# Patient Record
Sex: FEMALE | Race: WHITE | Employment: OTHER | URBAN - METROPOLITAN AREA
[De-identification: names, ages, dates, MRNs, and addresses within clinical notes are randomized per-mention and may not be internally consistent; named-entity substitution may affect disease eponyms.]

---

## 2022-03-17 ENCOUNTER — APPOINTMENT (OUTPATIENT)
Dept: CT IMAGING | Age: 82
End: 2022-03-17
Payer: COMMERCIAL

## 2022-03-17 ENCOUNTER — APPOINTMENT (OUTPATIENT)
Dept: GENERAL RADIOLOGY | Age: 82
End: 2022-03-17
Payer: COMMERCIAL

## 2022-03-17 ENCOUNTER — HOSPITAL ENCOUNTER (EMERGENCY)
Age: 82
Discharge: HOME | End: 2022-03-17
Attending: STUDENT IN AN ORGANIZED HEALTH CARE EDUCATION/TRAINING PROGRAM
Payer: COMMERCIAL

## 2022-03-17 VITALS
HEART RATE: 66 BPM | RESPIRATION RATE: 18 BRPM | TEMPERATURE: 98.2 F | DIASTOLIC BLOOD PRESSURE: 114 MMHG | SYSTOLIC BLOOD PRESSURE: 145 MMHG | OXYGEN SATURATION: 96 %

## 2022-03-17 DIAGNOSIS — S39.012A STRAIN OF LUMBAR REGION, INITIAL ENCOUNTER: Primary | ICD-10-CM

## 2022-03-17 DIAGNOSIS — R93.7: ICD-10-CM
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
## 2022-03-17 LAB
ABSOLUTE EOS #: 0.06 K/UL (ref 0–0.44)
ABSOLUTE IMMATURE GRANULOCYTE: 0.04 K/UL (ref 0–0.3)
ABSOLUTE LYMPH #: 2.09 K/UL (ref 1.1–3.7)
ABSOLUTE MONO #: 0.67 K/UL (ref 0.1–1.2)
ANION GAP SERPL CALCULATED.3IONS-SCNC: 11 MMOL/L (ref 9–17)
BASOPHILS # BLD: 1 % (ref 0–2)
BASOPHILS ABSOLUTE: 0.08 K/UL (ref 0–0.2)
BILIRUBIN URINE: NEGATIVE
BUN BLDV-MCNC: 11 MG/DL (ref 8–23)
BUN/CREAT BLD: 18 (ref 9–20)
CALCIUM SERPL-MCNC: 9.6 MG/DL (ref 8.6–10.4)
CHLORIDE BLD-SCNC: 101 MMOL/L (ref 98–107)
CO2: 21 MMOL/L (ref 20–31)
COLOR: YELLOW
COMMENT UA: NORMAL
CREAT SERPL-MCNC: 0.61 MG/DL (ref 0.5–0.9)
EOSINOPHILS RELATIVE PERCENT: 1 % (ref 1–4)
GFR AFRICAN AMERICAN: >60 ML/MIN
GFR NON-AFRICAN AMERICAN: >60 ML/MIN
GFR SERPL CREATININE-BSD FRML MDRD: ABNORMAL ML/MIN/{1.73_M2}
GLUCOSE BLD-MCNC: 94 MG/DL (ref 70–99)
GLUCOSE URINE: NEGATIVE
HCT VFR BLD CALC: 41.4 % (ref 36.3–47.1)
HEMOGLOBIN: 13.8 G/DL (ref 11.9–15.1)
IMMATURE GRANULOCYTES: 0 %
KETONES, URINE: NEGATIVE
LEUKOCYTE ESTERASE, URINE: NEGATIVE
LYMPHOCYTES # BLD: 18 % (ref 24–43)
MCH RBC QN AUTO: 30.7 PG (ref 25.2–33.5)
MCHC RBC AUTO-ENTMCNC: 33.3 G/DL (ref 28.4–34.8)
MCV RBC AUTO: 92.2 FL (ref 82.6–102.9)
MONOCYTES # BLD: 6 % (ref 3–12)
MYOGLOBIN: 29 NG/ML (ref 25–58)
NITRITE, URINE: NEGATIVE
NRBC AUTOMATED: 0 PER 100 WBC
PDW BLD-RTO: 12.4 % (ref 11.8–14.4)
PH UA: 6 (ref 5–8)
PLATELET # BLD: 264 K/UL (ref 138–453)
PMV BLD AUTO: 10.4 FL (ref 8.1–13.5)
POTASSIUM SERPL-SCNC: 3.7 MMOL/L (ref 3.7–5.3)
PRO-BNP: 291 PG/ML
PROTEIN UA: NEGATIVE
RBC # BLD: 4.49 M/UL (ref 3.95–5.11)
SEG NEUTROPHILS: 74 % (ref 36–65)
SEGMENTED NEUTROPHILS ABSOLUTE COUNT: 8.68 K/UL (ref 1.5–8.1)
SODIUM BLD-SCNC: 133 MMOL/L (ref 135–144)
SPECIFIC GRAVITY UA: 1.02 (ref 1–1.03)
TROPONIN, HIGH SENSITIVITY: 9 NG/L (ref 0–14)
TURBIDITY: CLEAR
URINE HGB: NEGATIVE
UROBILINOGEN, URINE: NORMAL
WBC # BLD: 11.6 K/UL (ref 3.5–11.3)

## 2022-03-17 PROCEDURE — 6360000002 HC RX W HCPCS: Performed by: STUDENT IN AN ORGANIZED HEALTH CARE EDUCATION/TRAINING PROGRAM

## 2022-03-17 PROCEDURE — 93005 ELECTROCARDIOGRAM TRACING: CPT | Performed by: NURSE PRACTITIONER

## 2022-03-17 PROCEDURE — 72131 CT LUMBAR SPINE W/O DYE: CPT

## 2022-03-17 PROCEDURE — 81003 URINALYSIS AUTO W/O SCOPE: CPT

## 2022-03-17 PROCEDURE — 71045 X-RAY EXAM CHEST 1 VIEW: CPT

## 2022-03-17 PROCEDURE — 80048 BASIC METABOLIC PNL TOTAL CA: CPT

## 2022-03-17 PROCEDURE — 72192 CT PELVIS W/O DYE: CPT

## 2022-03-17 PROCEDURE — 87086 URINE CULTURE/COLONY COUNT: CPT

## 2022-03-17 PROCEDURE — 72125 CT NECK SPINE W/O DYE: CPT

## 2022-03-17 PROCEDURE — 6370000000 HC RX 637 (ALT 250 FOR IP): Performed by: NURSE PRACTITIONER

## 2022-03-17 PROCEDURE — 70450 CT HEAD/BRAIN W/O DYE: CPT

## 2022-03-17 PROCEDURE — 96372 THER/PROPH/DIAG INJ SC/IM: CPT

## 2022-03-17 PROCEDURE — 6360000002 HC RX W HCPCS: Performed by: NURSE PRACTITIONER

## 2022-03-17 PROCEDURE — 99283 EMERGENCY DEPT VISIT LOW MDM: CPT

## 2022-03-17 PROCEDURE — 96374 THER/PROPH/DIAG INJ IV PUSH: CPT

## 2022-03-17 PROCEDURE — 84484 ASSAY OF TROPONIN QUANT: CPT

## 2022-03-17 PROCEDURE — 83874 ASSAY OF MYOGLOBIN: CPT

## 2022-03-17 PROCEDURE — 85025 COMPLETE CBC W/AUTO DIFF WBC: CPT

## 2022-03-17 PROCEDURE — 83880 ASSAY OF NATRIURETIC PEPTIDE: CPT

## 2022-03-17 RX ORDER — FENTANYL CITRATE 50 UG/ML
25 INJECTION, SOLUTION INTRAMUSCULAR; INTRAVENOUS ONCE
Status: COMPLETED | OUTPATIENT
Start: 2022-03-17 | End: 2022-03-17

## 2022-03-17 RX ORDER — LIDOCAINE 4 G/G
1 PATCH TOPICAL ONCE
Status: DISCONTINUED | OUTPATIENT
Start: 2022-03-17 | End: 2022-03-18 | Stop reason: HOSPADM

## 2022-03-17 RX ORDER — FENTANYL CITRATE 50 UG/ML
25 INJECTION, SOLUTION INTRAMUSCULAR; INTRAVENOUS ONCE
Status: DISCONTINUED | OUTPATIENT
Start: 2022-03-17 | End: 2022-03-17

## 2022-03-17 RX ORDER — LIDOCAINE 50 MG/G
1 PATCH TOPICAL DAILY
Qty: 5 PATCH | Refills: 0 | Status: SHIPPED | OUTPATIENT
Start: 2022-03-17 | End: 2022-03-22

## 2022-03-17 RX ADMIN — FENTANYL CITRATE 25 MCG: 50 INJECTION, SOLUTION INTRAMUSCULAR; INTRAVENOUS at 20:37

## 2022-03-17 RX ADMIN — FENTANYL CITRATE 25 MCG: 50 INJECTION, SOLUTION INTRAMUSCULAR; INTRAVENOUS at 21:13

## 2022-03-17 ASSESSMENT — ENCOUNTER SYMPTOMS
VOMITING: 0
NAUSEA: 0
COUGH: 0
ABDOMINAL PAIN: 0
COLOR CHANGE: 0
SHORTNESS OF BREATH: 0
BACK PAIN: 1

## 2022-03-17 ASSESSMENT — PAIN SCALES - GENERAL
PAINLEVEL_OUTOF10: 9
PAINLEVEL_OUTOF10: 9

## 2022-03-17 ASSESSMENT — VISUAL ACUITY: OU: 1

## 2022-03-17 NOTE — ED PROVIDER NOTES
[address] [facility] ED  EMERGENCY DEPARTMENT ENCOUNTER      Pt Name: [place]  MRN: [id number]  [place] 1940  Date of evaluation: 3/17/2022  Provider: RAYNA Mancilla CNP    CHIEF COMPLAINT       Chief Complaint   Patient presents with   Vladimir Harrison from standing today; c/o back pain; hx of pelvic fx; unknown LOC    Back Pain         HISTORY OFPRESENT ILLNESS  (Location/Symptom, Timing/Onset, Context/Setting, Quality, Duration, Modifying Factors, Severity.)   [place] is a [de-identified] y.o. female who presents to the emergency department by EMS for evaluation of lower back pain after she fell earlier at home today. The nurse, fall was witnessed by her . Patient is poor historian. Patient fell she was she did get up and went lay in her bed but then did not want to get out of bed later today.  called 46. Patient has chest pain, headache, dizziness shortness of breath or cough. She has a history of cortical basal ganglionic degeneration, vertigo, post polio syndrome, hypertension, UTI, and hard of hearing. Nursing Notes were reviewed. PASTMEDICAL HISTORY   No past medical history on file. SURGICAL HISTORY     No past surgical history on file. CURRENT MEDICATIONS     Discharge Medication List as of 3/17/2022 11:20 PM          ALLERGIES     Codeine    FAMILY HISTORY     No family history on file.        SOCIAL HISTORY       Social History     Socioeconomic History    Marital status:      Spouse name: Not on file    Number of children: Not on file    Years of education: Not on file    Highest education level: Not on file   Occupational History    Not on file   Tobacco Use    Smoking status: Not on file    Smokeless tobacco: Not on file   Substance and Sexual Activity    Alcohol use: Not on file    Drug use: Not on file    Sexual activity: Not on file   Other Topics Concern    Not on file   Social History Narrative    Not on file     Social Determinants of Health     Financial Resource Strain:     Difficulty of Paying Living Expenses: Not on file   Food Insecurity:     Worried About Running Out of Food in the Last Year: Not on file    Chirag of Food in the Last Year: Not on file   Transportation Needs:     Lack of Transportation (Medical): Not on file    Lack of Transportation (Non-Medical): Not on file   Physical Activity:     Days of Exercise per Week: Not on file    Minutes of Exercise per Session: Not on file   Stress:     Feeling of Stress : Not on file   Social Connections:     Frequency of Communication with Friends and Family: Not on file    Frequency of Social Gatherings with Friends and Family: Not on file    Attends Jew Services: Not on file    Active Member of [address] [place] [address] [employer] or Organizations: Not on file    Attends Club or Organization Meetings: Not on file    Marital Status: Not on file   Intimate Partner Violence:     Fear of Current or Ex-Partner: Not on file    Emotionally Abused: Not on file    Physically Abused: Not on file    Sexually Abused: Not on file   Housing Stability:     Unable to Pay for Housing in the Last Year: Not on file    Number of [place] in the Last Year: Not on file    Unstable Housing in the Last Year: Not on file         REVIEW OF SYSTEMS    (2-9 systems for level 4, 10 or more for level 5)     Review of Systems   Constitutional: Positive for activity change. Respiratory: Negative for cough and shortness of breath. Cardiovascular: Negative for chest pain. Gastrointestinal: Negative for abdominal pain, nausea and vomiting. Musculoskeletal: Positive for back pain. Negative for neck pain. Skin: Negative for color change and wound. Neurological: Positive for weakness. Negative for facial asymmetry, light-headedness, numbness and headaches. Generalized weakness   Psychiatric/Behavioral: Positive for confusion. All other systems reviewed and are negative.     Except as noted above the remainder of the review of systems was reviewed and negative. PHYSICAL EXAM    (up to 7 for level 4, 8 or more for level 5)     ED Triage Vitals   BP Temp Temp src Pulse Resp SpO2 Height Weight   03/17/22 1942 03/17/22 1940 -- 03/17/22 1940 03/17/22 1940 03/17/22 1940 -- --   (!) 145/114 98.2 °F (36.8 °C)  66 18 96 %         Physical Exam  Constitutional:       General: She is not in acute distress. Appearance: Normal appearance. She is well-developed, well-groomed and normal weight. HENT:      Head: Normocephalic and atraumatic. Right Ear: External ear normal.      Nose: Nose normal.      Mouth/Throat:      Mouth: Mucous membranes are moist.   Eyes:      General: Lids are normal. Vision grossly intact. Extraocular Movements: Extraocular movements intact. Conjunctiva/sclera: Conjunctivae normal.      Pupils: Pupils are equal, round, and reactive to light. Cardiovascular:      Rate and Rhythm: Normal rate and regular rhythm. Pulses: Normal pulses. Dorsalis pedis pulses are 2+ on the right side and 2+ on the left side. Posterior tibial pulses are 2+ on the right side and 2+ on the left side. Heart sounds: Normal heart sounds, S1 normal and S2 normal.   Pulmonary:      Effort: Pulmonary effort is normal.      Breath sounds: Normal breath sounds. Abdominal:      General: Bowel sounds are normal.      Palpations: Abdomen is soft. Tenderness: There is no abdominal tenderness. There is no guarding or rebound. Musculoskeletal:         General: Normal range of motion. Cervical back: Normal, full passive range of motion without pain, normal range of motion and neck supple. No swelling or tenderness. No spinous process tenderness or muscular tenderness. Normal range of motion. Thoracic back: Normal. No swelling or tenderness. Lumbar back: Tenderness present. No swelling or edema. Back:       Right hip: Normal. No tenderness.  Normal range of motion. Normal strength. Left hip: Normal. No tenderness. Normal range of motion. Normal strength. Right lower leg: No edema. Left lower leg: No edema. Comments: Patient has midline lumbar tenderness palpation. No laceration, abrasion, erythema, swelling or ecchymosis noted to the lumbar area. Skin:     General: Skin is warm and dry. Findings: No bruising, erythema or rash. Neurological:      Mental Status: She is alert. GCS: GCS eye subscore is 4. GCS verbal subscore is 4. GCS motor subscore is 6. Cranial Nerves: Cranial nerves are intact. Sensory: Sensation is intact. Motor: Motor function is intact. Comments: Patient is alert. Oriented to herself only. Moves all extremities to command. No cranial nerve deficit. Psychiatric:         Mood and Affect: Mood normal.         Speech: Speech normal.         Behavior: Behavior is cooperative. Cognition and Memory: She exhibits impaired recent memory. Judgment: Judgment normal.           DIAGNOSTIC RESULTS     EKG:All EKG's are interpreted by the Emergency Department Physician who either signs or Co-signs this chart in the absence of a cardiologist.    EKG interpreted by attending physician    RADIOLOGY:   Non-plain film images such as CT, Ultrasound and MRI are read by theradiologist. Plain radiographic images are visualized and preliminarily interpreted by the emergency physician with the below findings:    CT HEAD WO CONTRAST    Result Date: 3/17/2022  EXAMINATION: CT OF THE HEAD WITHOUT CONTRAST  3/17/2022 8:11 pm TECHNIQUE: CT of the head was performed without the administration of intravenous contrast. Dose modulation, iterative reconstruction, and/or weight based adjustment of the mA/kV was utilized to reduce the radiation dose to as low as reasonably achievable. COMPARISON: None.  HISTORY: ORDERING SYSTEM PROVIDED HISTORY: fall, trauma TECHNOLOGIST PROVIDED HISTORY: fall, trauma Decision Support Exception - unselect if not a suspected or confirmed emergency medical condition->Emergency Medical Condition ([place]) Reason for Exam: fall, confusion, unable to hold still FINDINGS: BRAIN/VENTRICLES: There is no acute intracranial hemorrhage, mass effect or midline shift. No abnormal extra-axial fluid collection. The gray-white differentiation is maintained without evidence of an acute infarct. There is prominence of the ventricles and sulci due to global parenchymal volume loss. There are nonspecific areas of hypoattenuation within the periventricular and subcortical white matter, which likely represent chronic microvascular ischemic change. ORBITS: The visualized portion of the orbits demonstrate no acute abnormality. SINUSES: The visualized paranasal sinuses and mastoid air cells demonstrate no acute abnormality. SOFT TISSUES/SKULL: No acute abnormality of the visualized skull or soft tissues. Severe atrophy. No acute disease. Motion limited exam.     CT CERVICAL SPINE WO CONTRAST    Result Date: 3/17/2022  EXAMINATION: CT OF THE CERVICAL SPINE WITHOUT CONTRAST 3/17/2022 8:11 pm TECHNIQUE: CT of the cervical spine was performed without the administration of intravenous contrast. Multiplanar reformatted images are provided for review. Dose modulation, iterative reconstruction, and/or weight based adjustment of the mA/kV was utilized to reduce the radiation dose to as low as reasonably achievable. COMPARISON: None. HISTORY: ORDERING SYSTEM PROVIDED HISTORY: pain, fall TECHNOLOGIST PROVIDED HISTORY: pain, fall Decision Support Exception - unselect if not a suspected or confirmed emergency medical condition->Emergency Medical Condition ([place]) Reason for Exam: fall, confusion, unable to hold still FINDINGS: BONES/ALIGNMENT: Study is mildly motion degraded. Bones are diffusely demineralized. There is a mild degenerative anterolisthesis of C4 on C5 and C6 on C7.   Vertebral body alignment is otherwise unremarkable. Cervical vertebral body heights are normal.  There is no acute fracture or traumatic malalignment. DEGENERATIVE CHANGES: There are multilevel degenerative changes particularly multilevel facet arthropathy and C5-C6 degenerative disc disease. Alessandro Pickles SOFT TISSUES: There is no prevertebral soft tissue swelling. Multilevel degenerative changes with no acute fracture or traumatic malalignment. RECOMMENDATIONS: Unavailable     CT LUMBAR SPINE WO CONTRAST    Result Date: 3/18/2022  EXAMINATION: CT OF THE LUMBAR SPINE WITHOUT CONTRAST; CT OF THE PELVIS WITHOUT CONTRAST 3/17/2022 TECHNIQUE: CT of the lumbar spine was performed without the administration of intravenous contrast. Multiplanar reformatted images are provided for review. Adjustment of mA and/or kV according to patient size was utilized. Dose modulation, iterative reconstruction, and/or weight based adjustment of the mA/kV was utilized to reduce the radiation dose to as low as reasonably achievable.; CT of the pelvis was performed without the administration of intravenous contrast.  Multiplanar reformatted images are provided for review. Adjustment of mA and/or kV according to patient size was utilized. Dose modulation, iterative reconstruction, and/or weight based adjustment of the mA/kV was utilized to reduce the radiation dose to as low as reasonably achievable. COMPARISON: None HISTORY: ORDERING SYSTEM PROVIDED HISTORY: pain, fall TECHNOLOGIST PROVIDED HISTORY: pain, fall Decision Support Exception - unselect if not a suspected or confirmed emergency medical condition->Emergency Medical Condition ([place]) Reason for Exam: fall, confusion, unable to hold still FINDINGS: BONES/ALIGNMENT: The spine is labeled such that there is a transitional L5 type vertebral body. With this numbering scheme, there is grade 1 anterolisthesis of L5 on S1. There is an L1 burst fracture that is likely chronic.   There is retropulsion of fracture fragments into the spinal canal by 3 mm. No acute fracture. No osseous destructive lesion. DEGENERATIVE CHANGES: There is multilevel degenerative disc disease in the lumbar spine. L1-L2: There is no significant disc herniation, foraminal narrowing or central spinal canal stenosis. L2-L3: There is a disc bulge lateralizing to the left. Mild left foraminal narrowing. No right foraminal narrowing or central spinal canal stenosis. L3-L4: There is a circumferential disc bulge. There is facet arthropathy and ligamentum flavum thickening. Mild central spinal canal narrowing. Moderate-severe left and mild right foraminal narrowing. L4-L5: There is a circumferential disc bulge. Facet arthropathy and ligamentum flavum thickening. Moderate central spinal canal narrowing. Minimal bilateral foraminal narrowing. L5-S1: There is a circumferential disc bulge. There is bilateral facet arthropathy and ligamentum flavum thickening. Mild central spinal canal stenosis. Mild right foraminal narrowing. No left foraminal narrowing. S1-S2: There is no significant disc herniation, foraminal narrowing or central spinal canal stenosis. SOFT TISSUES/RETROPERITONEUM: Vascular calcifications are noted in the aorta and its branch vessels. The lung bases are clear. No paraspinal masses. The bladder is distended with urine. The visualized hollow visceral organs are unremarkable. The uterus is surgically absent. No areas of abnormal soft tissue swelling. CT PELVIS: Degenerative changes are noted along the sacroiliac joints. There is an old fracture noted through the left superior and inferior pubic rami. The bones are osteopenic. No acute fracture. Degenerative changes are noted in the hips. There is a fracture along the S4/S5 segment that is age indeterminate. No presacral edema. 1. Age-indeterminate fracture noted through the S4/S5 segment of the sacrum. MRI could be performed to better assess the chronicity if clinically indicated.  2. Old fractures are noted through the left superior and inferior pubic rami. 3. Suspected chronic L1 burst fracture. 4. No other acute fractures. 5. Osteopenia. CT PELVIS WO CONTRAST Additional Contrast? None    Result Date: 3/18/2022  EXAMINATION: CT OF THE LUMBAR SPINE WITHOUT CONTRAST; CT OF THE PELVIS WITHOUT CONTRAST 3/17/2022 TECHNIQUE: CT of the lumbar spine was performed without the administration of intravenous contrast. Multiplanar reformatted images are provided for review. Adjustment of mA and/or kV according to patient size was utilized. Dose modulation, iterative reconstruction, and/or weight based adjustment of the mA/kV was utilized to reduce the radiation dose to as low as reasonably achievable.; CT of the pelvis was performed without the administration of intravenous contrast.  Multiplanar reformatted images are provided for review. Adjustment of mA and/or kV according to patient size was utilized. Dose modulation, iterative reconstruction, and/or weight based adjustment of the mA/kV was utilized to reduce the radiation dose to as low as reasonably achievable. COMPARISON: None HISTORY: ORDERING SYSTEM PROVIDED HISTORY: pain, fall TECHNOLOGIST PROVIDED HISTORY: pain, fall Decision Support Exception - unselect if not a suspected or confirmed emergency medical condition->Emergency Medical Condition ([place]) Reason for Exam: fall, confusion, unable to hold still FINDINGS: BONES/ALIGNMENT: The spine is labeled such that there is a transitional L5 type vertebral body. With this numbering scheme, there is grade 1 anterolisthesis of L5 on S1. There is an L1 burst fracture that is likely chronic. There is retropulsion of fracture fragments into the spinal canal by 3 mm. No acute fracture. No osseous destructive lesion. DEGENERATIVE CHANGES: There is multilevel degenerative disc disease in the lumbar spine. L1-L2: There is no significant disc herniation, foraminal narrowing or central spinal canal stenosis.  L2-L3: There is a disc bulge lateralizing to the left. Mild left foraminal narrowing. No right foraminal narrowing or central spinal canal stenosis. L3-L4: There is a circumferential disc bulge. There is facet arthropathy and ligamentum flavum thickening. Mild central spinal canal narrowing. Moderate-severe left and mild right foraminal narrowing. L4-L5: There is a circumferential disc bulge. Facet arthropathy and ligamentum flavum thickening. Moderate central spinal canal narrowing. Minimal bilateral foraminal narrowing. L5-S1: There is a circumferential disc bulge. There is bilateral facet arthropathy and ligamentum flavum thickening. Mild central spinal canal stenosis. Mild right foraminal narrowing. No left foraminal narrowing. S1-S2: There is no significant disc herniation, foraminal narrowing or central spinal canal stenosis. SOFT TISSUES/RETROPERITONEUM: Vascular calcifications are noted in the aorta and its branch vessels. The lung bases are clear. No paraspinal masses. The bladder is distended with urine. The visualized hollow visceral organs are unremarkable. The uterus is surgically absent. No areas of abnormal soft tissue swelling. CT PELVIS: Degenerative changes are noted along the sacroiliac joints. There is an old fracture noted through the left superior and inferior pubic rami. The bones are osteopenic. No acute fracture. Degenerative changes are noted in the hips. There is a fracture along the S4/S5 segment that is age indeterminate. No presacral edema. 1. Age-indeterminate fracture noted through the S4/S5 segment of the sacrum. MRI could be performed to better assess the chronicity if clinically indicated. 2. Old fractures are noted through the left superior and inferior pubic rami. 3. Suspected chronic L1 burst fracture. 4. No other acute fractures. 5. Osteopenia.      XR CHEST PORTABLE    Result Date: 3/17/2022  EXAMINATION: ONE XRAY VIEW OF THE CHEST 3/17/2022 8:33 pm COMPARISON: None. HISTORY: ORDERING SYSTEM PROVIDED HISTORY: fall, trauma TECHNOLOGIST PROVIDED HISTORY: fall, trauma Reason for Exam: Fall today, trauma FINDINGS: The lungs are without acute focal process. There is no pneumothorax. The cardiomediastinal silhouette is without acute process. The osseous structures are without acute process. No acute process. Soft tissue density right chest requires clinical correlation. Possible small left effusion. Interpretation per the Radiologist below, if available at the time of this note:    CT PELVIS WO CONTRAST Additional Contrast? None   Final Result   1. Age-indeterminate fracture noted through the S4/S5 segment of the sacrum. MRI could be performed to better assess the chronicity if clinically   indicated. 2. Old fractures are noted through the left superior and inferior pubic rami. 3. Suspected chronic L1 burst fracture. 4. No other acute fractures. 5. Osteopenia. CT LUMBAR SPINE WO CONTRAST   Final Result   1. Age-indeterminate fracture noted through the S4/S5 segment of the sacrum. MRI could be performed to better assess the chronicity if clinically   indicated. 2. Old fractures are noted through the left superior and inferior pubic rami. 3. Suspected chronic L1 burst fracture. 4. No other acute fractures. 5. Osteopenia. CT CERVICAL SPINE WO CONTRAST   Final Result   Multilevel degenerative changes with no acute fracture or traumatic   malalignment. RECOMMENDATIONS:   Unavailable         CT HEAD WO CONTRAST   Final Result   Severe atrophy. No acute disease. Motion limited exam.         XR CHEST PORTABLE   Final Result   No acute process. Soft tissue density right chest requires clinical correlation. Possible   small left effusion.                EDBEDSIDE ULTRASOUND:   Performed by Александр De Los Santos - none    LABS:  Labs Reviewed   CBC WITH AUTO DIFFERENTIAL - Abnormal; Notable for the following components:       Result Value WBC 11.6 (*)     Seg Neutrophils 74 (*)     Lymphocytes 18 (*)     Segs Absolute 8.68 (*)     All other components within normal limits   BASIC METABOLIC PANEL W/ REFLEX TO MG FOR LOW K - Abnormal; Notable for the following components:    Sodium 133 (*)     All other components within normal limits   CULTURE, URINE   TROP/MYOGLOBIN   URINALYSIS   BRAIN NATRIURETIC PEPTIDE       All other labs were within normal range or not returned as of this dictation. EMERGENCY DEPARTMENT COURSE andDIFFERENTIAL DIAGNOSIS/MDM:   Patient evaluated in conjunction attending physician. Urinalysis does not show infection. Labs reviewed. CT scan shows age-indeterminate fracture noted through the S4-S5 segment of the sacrum. Suspected chronic L1 burst fracture. Old fractures to the left superior inferior pubic rami. Patient has tenderness to the midline lumbar area but not lower towards the buttock or the sacrum. CT head negative for acute disease. Severe atrophy. The cervical spine negative for acute fracture. X-ray shows no acute process. No focal neurological deficits. Patient's  at bedside. States patient's mental status is at baseline. Patient was also missed command. She was able to ambulate in the room with assist.  Was given fentanyl for pain. Lidoderm Derm patch to the midline lumbar area. Discussed imaging results with the patient's spouse. Given referral to follow-up with orthopedic doctor for evaluation of age-indeterminate S4 S5 fracture. Patient written for Lidoderm patches. Patient's  states that he is working on getting the patient into a memory care facility in the near future. Also to follow-up with PCP. Vitals:    Vitals:    03/17/22 1940 03/17/22 1942   BP:  (!) 145/114   Pulse: 66    Resp: 18    Temp: 98.2 °F (36.8 °C)    SpO2: 96%          CONSULTS:  None    RES:  Procedures    FINAL IMPRESSION      1. Strain of lumbar region, initial encounter    2.  Abnormal CT myelogram of lumbar spine    3. Fall, initial encounter          DISPOSITION/PLAN   DISPOSITION Decision To Discharge 03/17/2022 11:17:27 PM      PATIENT REFERRED TO:   Alvin Davis MD  [address], [address] [facility]    Schedule an appointment as soon as possible for a visit       Jacinta Gayle MD  [address]   [address]  [address] [facility]  [phone]    In 1 week      [facility] ED  [address] [phone]    If symptoms worsen      DISCHARGE MEDICATIONS:     Discharge Medication List as of 3/17/2022 11:20 PM      START taking these medications    Details   lidocaine (LIDODERM) 5 % Place 1 patch onto the skin daily for 5 days 12 hours on, 12 hours off., Disp-5 patch, R-0Print           Electronically signed by RAYNA Tellez 3/18/2022 at 3:00 PM           RAYNA Tellez CNP  03/18/22 [id number]

## 2022-03-18 LAB
CULTURE: NO GROWTH
SPECIMEN DESCRIPTION: NORMAL

## 2022-03-18 NOTE — ED PROVIDER NOTES
eMERGENCY dEPARTMENT eNCOUnter   Independent Attestation     Pt Name: Andrea Cheng  MRN: [id number]  [place] 1940  Date of evaluation: 3/18/22     Andrea Cheng is a [de-identified] y.o. female with CC: Fall (Fall from standing today; c/o back pain; hx of pelvic fx; unknown LOC) and Back Pain        This visit was performed by both a physician and an APC. I performed all aspects of the MDM as documented. The care is provided during an unprecedented national emergency due to the novel coronavirus, COVID 19.     Valarie Cortez DO  Attending Emergency Physician             Valarie Cortez DO  03/18/22 [id number]

## 2022-03-19 LAB
EKG ATRIAL RATE: 70 BPM
EKG P-R INTERVAL: 152 MS
EKG Q-T INTERVAL: 444 MS
EKG QRS DURATION: 84 MS
EKG QTC CALCULATION (BAZETT): 479 MS
EKG R AXIS: -40 DEGREES
EKG T AXIS: 65 DEGREES
EKG VENTRICULAR RATE: 70 BPM

## 2022-03-19 PROCEDURE — 93010 ELECTROCARDIOGRAM REPORT: CPT | Performed by: INTERNAL MEDICINE

## 2022-04-04 ENCOUNTER — HOSPITAL ENCOUNTER (OUTPATIENT)
Age: 82
Setting detail: SPECIMEN
Discharge: HOME | End: 2022-04-04
Payer: COMMERCIAL

## 2022-04-04 LAB
ANION GAP SERPL CALCULATED.3IONS-SCNC: 16 MMOL/L (ref 9–17)
BUN BLDV-MCNC: 12 MG/DL (ref 8–23)
CALCIUM SERPL-MCNC: 9.2 MG/DL (ref 8.6–10.4)
CHLORIDE BLD-SCNC: 110 MMOL/L (ref 98–107)
CHOLESTEROL/HDL RATIO: 2.4
CHOLESTEROL: 128 MG/DL
CO2: 18 MMOL/L (ref 20–31)
CREAT SERPL-MCNC: 0.62 MG/DL (ref 0.5–0.9)
GFR AFRICAN AMERICAN: >60 ML/MIN
GFR NON-AFRICAN AMERICAN: >60 ML/MIN
GFR SERPL CREATININE-BSD FRML MDRD: ABNORMAL ML/MIN/{1.73_M2}
GLUCOSE BLD-MCNC: 83 MG/DL (ref 70–99)
HCT VFR BLD CALC: 37.7 % (ref 36.3–47.1)
HDLC SERPL-MCNC: 53 MG/DL
HEMOGLOBIN: 12 G/DL (ref 11.9–15.1)
LDL CHOLESTEROL: 50 MG/DL (ref 0–130)
MCH RBC QN AUTO: 31.3 PG (ref 25.2–33.5)
MCHC RBC AUTO-ENTMCNC: 31.8 G/DL (ref 28.4–34.8)
MCV RBC AUTO: 98.2 FL (ref 82.6–102.9)
NRBC AUTOMATED: 0 PER 100 WBC
PDW BLD-RTO: 13.6 % (ref 11.8–14.4)
PLATELET # BLD: 306 K/UL (ref 138–453)
PMV BLD AUTO: 10.9 FL (ref 8.1–13.5)
POTASSIUM SERPL-SCNC: 3.2 MMOL/L (ref 3.7–5.3)
RBC # BLD: 3.84 M/UL (ref 3.95–5.11)
SODIUM BLD-SCNC: 144 MMOL/L (ref 135–144)
TRIGL SERPL-MCNC: 124 MG/DL
TSH SERPL DL<=0.05 MIU/L-ACNC: 0.67 UIU/ML (ref 0.3–5)
WBC # BLD: 8.8 K/UL (ref 3.5–11.3)

## 2022-04-04 PROCEDURE — 36415 COLL VENOUS BLD VENIPUNCTURE: CPT

## 2022-04-04 PROCEDURE — 80048 BASIC METABOLIC PNL TOTAL CA: CPT

## 2022-04-04 PROCEDURE — 84443 ASSAY THYROID STIM HORMONE: CPT

## 2022-04-04 PROCEDURE — 85027 COMPLETE CBC AUTOMATED: CPT

## 2022-04-04 PROCEDURE — P9603 ONE-WAY ALLOW PRORATED MILES: HCPCS

## 2022-04-04 PROCEDURE — 80061 LIPID PANEL: CPT
